# Patient Record
Sex: MALE | Race: WHITE | NOT HISPANIC OR LATINO | Employment: FULL TIME | URBAN - METROPOLITAN AREA
[De-identification: names, ages, dates, MRNs, and addresses within clinical notes are randomized per-mention and may not be internally consistent; named-entity substitution may affect disease eponyms.]

---

## 2017-04-17 RX ORDER — CLOPIDOGREL BISULFATE 75 MG/1
TABLET ORAL
Qty: 30 TABLET | Refills: 0 | Status: SHIPPED | OUTPATIENT
Start: 2017-04-17 | End: 2017-05-18 | Stop reason: SDUPTHER

## 2017-04-21 RX ORDER — METOPROLOL TARTRATE 25 MG/1
TABLET, FILM COATED ORAL
Qty: 30 TABLET | Refills: 5 | Status: SHIPPED | OUTPATIENT
Start: 2017-04-21

## 2017-05-21 RX ORDER — CLOPIDOGREL BISULFATE 75 MG/1
TABLET ORAL
Qty: 30 TABLET | Refills: 0 | Status: SHIPPED | OUTPATIENT
Start: 2017-05-21

## 2017-09-05 ENCOUNTER — TELEPHONE (OUTPATIENT)
Dept: CARDIOLOGY | Facility: CLINIC | Age: 47
End: 2017-09-05

## 2017-09-05 NOTE — TELEPHONE ENCOUNTER
----- Message from Beth Araujo sent at 9/5/2017 11:00 AM CDT -----  Contact: Self/ [phone]  Patient would like to speak with you about getting a release form to go back to work. Please advise.

## 2017-09-05 NOTE — TELEPHONE ENCOUNTER
Returned call to patient. He states he is currently trying to get hired to be a . 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 Due to patient not having insurance he has not followed up since we last saw him in 2016.    Patient will bring in the paper work to see if there is something that we could complete with his previous diagnosis/information. Advised patient that Dr. Wharton would need to see him to evaluate prior to signing his clearance. Lastly, I advised him to call [employer] since he does not have insurance and does not have $500 to pay for an office visit.

## 2017-09-07 NOTE — TELEPHONE ENCOUNTER
Dr. Wharton completed for regarding patient returning to work.    Patient will need a follow up appointment for future clearance./

## 2017-09-29 RX ORDER — METOPROLOL TARTRATE 25 MG/1
TABLET, FILM COATED ORAL
Qty: 30 TABLET | Refills: 5 | OUTPATIENT
Start: 2017-09-29